# Patient Record
Sex: MALE | Race: WHITE | NOT HISPANIC OR LATINO | Employment: OTHER | URBAN - METROPOLITAN AREA
[De-identification: names, ages, dates, MRNs, and addresses within clinical notes are randomized per-mention and may not be internally consistent; named-entity substitution may affect disease eponyms.]

---

## 2023-02-28 DIAGNOSIS — R29.898 BILATERAL LEG WEAKNESS: Primary | ICD-10-CM

## 2023-08-22 ENCOUNTER — OFFICE VISIT (OUTPATIENT)
Dept: RHEUMATOLOGY | Facility: CLINIC | Age: 58
End: 2023-08-22
Payer: COMMERCIAL

## 2023-08-22 VITALS
DIASTOLIC BLOOD PRESSURE: 84 MMHG | TEMPERATURE: 99 F | WEIGHT: 214.81 LBS | HEIGHT: 78 IN | BODY MASS INDEX: 24.85 KG/M2 | SYSTOLIC BLOOD PRESSURE: 126 MMHG | HEART RATE: 80 BPM | OXYGEN SATURATION: 98 %

## 2023-08-22 DIAGNOSIS — Z29.89 IMMUNOTHERAPY: ICD-10-CM

## 2023-08-22 DIAGNOSIS — R76.8 POSITIVE ANA (ANTINUCLEAR ANTIBODY): ICD-10-CM

## 2023-08-22 DIAGNOSIS — G47.00 INSOMNIA, UNSPECIFIED TYPE: ICD-10-CM

## 2023-08-22 DIAGNOSIS — M79.7 FIBROMYALGIA SYNDROME: ICD-10-CM

## 2023-08-22 DIAGNOSIS — G62.9 NEUROPATHY: ICD-10-CM

## 2023-08-22 DIAGNOSIS — M35.9 UNDIFFERENTIATED CONNECTIVE TISSUE DISEASE: Primary | ICD-10-CM

## 2023-08-22 PROCEDURE — 3074F SYST BP LT 130 MM HG: CPT | Mod: CPTII,S$GLB,, | Performed by: INTERNAL MEDICINE

## 2023-08-22 PROCEDURE — 3079F PR MOST RECENT DIASTOLIC BLOOD PRESSURE 80-89 MM HG: ICD-10-PCS | Mod: CPTII,S$GLB,, | Performed by: INTERNAL MEDICINE

## 2023-08-22 PROCEDURE — 4010F PR ACE/ARB THEARPY RXD/TAKEN: ICD-10-PCS | Mod: CPTII,S$GLB,, | Performed by: INTERNAL MEDICINE

## 2023-08-22 PROCEDURE — 1159F PR MEDICATION LIST DOCUMENTED IN MEDICAL RECORD: ICD-10-PCS | Mod: CPTII,S$GLB,, | Performed by: INTERNAL MEDICINE

## 2023-08-22 PROCEDURE — 3008F PR BODY MASS INDEX (BMI) DOCUMENTED: ICD-10-PCS | Mod: CPTII,S$GLB,, | Performed by: INTERNAL MEDICINE

## 2023-08-22 PROCEDURE — 1159F MED LIST DOCD IN RCRD: CPT | Mod: CPTII,S$GLB,, | Performed by: INTERNAL MEDICINE

## 2023-08-22 PROCEDURE — 3079F DIAST BP 80-89 MM HG: CPT | Mod: CPTII,S$GLB,, | Performed by: INTERNAL MEDICINE

## 2023-08-22 PROCEDURE — 99204 OFFICE O/P NEW MOD 45 MIN: CPT | Mod: S$GLB,,, | Performed by: INTERNAL MEDICINE

## 2023-08-22 PROCEDURE — 99999 PR PBB SHADOW E&M-EST. PATIENT-LVL IV: ICD-10-PCS | Mod: PBBFAC,,, | Performed by: INTERNAL MEDICINE

## 2023-08-22 PROCEDURE — 3074F PR MOST RECENT SYSTOLIC BLOOD PRESSURE < 130 MM HG: ICD-10-PCS | Mod: CPTII,S$GLB,, | Performed by: INTERNAL MEDICINE

## 2023-08-22 PROCEDURE — 99999 PR PBB SHADOW E&M-EST. PATIENT-LVL IV: CPT | Mod: PBBFAC,,, | Performed by: INTERNAL MEDICINE

## 2023-08-22 PROCEDURE — 3008F BODY MASS INDEX DOCD: CPT | Mod: CPTII,S$GLB,, | Performed by: INTERNAL MEDICINE

## 2023-08-22 PROCEDURE — 99204 PR OFFICE/OUTPT VISIT, NEW, LEVL IV, 45-59 MIN: ICD-10-PCS | Mod: S$GLB,,, | Performed by: INTERNAL MEDICINE

## 2023-08-22 PROCEDURE — 4010F ACE/ARB THERAPY RXD/TAKEN: CPT | Mod: CPTII,S$GLB,, | Performed by: INTERNAL MEDICINE

## 2023-08-22 RX ORDER — TIZANIDINE 4 MG/1
4 TABLET ORAL NIGHTLY
Qty: 30 TABLET | Refills: 5 | Status: SHIPPED | OUTPATIENT
Start: 2023-08-22 | End: 2024-02-18

## 2023-08-22 RX ORDER — ALBUTEROL SULFATE 90 UG/1
2 AEROSOL, METERED RESPIRATORY (INHALATION)
COMMUNITY
Start: 2023-04-25

## 2023-08-22 RX ORDER — LEVOTHYROXINE SODIUM 75 UG/1
75 TABLET ORAL DAILY
COMMUNITY
Start: 2023-08-15

## 2023-08-22 RX ORDER — TRAMADOL HYDROCHLORIDE 50 MG/1
50-100 TABLET ORAL EVERY 8 HOURS PRN
COMMUNITY
Start: 2023-06-22 | End: 2023-08-22 | Stop reason: SDUPTHER

## 2023-08-22 RX ORDER — CALCIUM CARBONATE/VITAMIN D3 600 MG-10
100 TABLET ORAL DAILY
COMMUNITY
Start: 2023-08-18

## 2023-08-22 RX ORDER — FOLIC ACID 1 MG/1
1000 TABLET ORAL DAILY
COMMUNITY
Start: 2023-07-20

## 2023-08-22 RX ORDER — FLUTICASONE FUROATE, UMECLIDINIUM BROMIDE AND VILANTEROL TRIFENATATE 200; 62.5; 25 UG/1; UG/1; UG/1
1 POWDER RESPIRATORY (INHALATION) DAILY
COMMUNITY
Start: 2023-07-11

## 2023-08-22 RX ORDER — TRAMADOL HYDROCHLORIDE 50 MG/1
50-100 TABLET ORAL EVERY 8 HOURS PRN
Qty: 100 TABLET | Refills: 5 | Status: SHIPPED | OUTPATIENT
Start: 2023-08-22

## 2023-08-22 RX ORDER — OMEPRAZOLE 40 MG/1
40 CAPSULE, DELAYED RELEASE ORAL DAILY
COMMUNITY

## 2023-08-22 RX ORDER — HYDROXYCHLOROQUINE SULFATE 200 MG/1
200 TABLET, FILM COATED ORAL 2 TIMES DAILY
Qty: 60 TABLET | Refills: 5 | Status: SHIPPED | OUTPATIENT
Start: 2023-08-22 | End: 2024-02-22

## 2023-08-22 RX ORDER — FLUTICASONE PROPIONATE 50 MCG
50 SPRAY, SUSPENSION (ML) NASAL DAILY
COMMUNITY

## 2023-08-22 RX ORDER — GABAPENTIN 600 MG/1
600 TABLET ORAL 3 TIMES DAILY
COMMUNITY
Start: 2023-08-16

## 2023-08-22 NOTE — PROGRESS NOTES
"Subjective:       Patient ID: Shane Lovell is a 58 y.o. male.    Chief Complaint: Referral (Referral for +SHIRLEY. Patient states that he has seen a Rheumatologist for RA. He was put on a medication but has not been on it due to having been diagnosed with Cancer and going through chemo. Patient complains of fatigue,being cold all the time, dry mouth, neuropathy, joint pain, knee pain.  He is having immune therapy no longer taking chemo. He has been diagnosed with carpal tunnel in both wrists recently. Patient states that he is unsteady at times. Pain 5/10.)    Pt  is complaining of joint pain involving his MCP PIP wrist elbow shoulders hips knees and ankles bilaterally.  Is 8/10 in intensity dull in quality and continuous.  It is associated with a morning stiffness lasting for more than 60 minutes. Pt reports having difficulty maintaining a good night of sleep and this has been associated with myalgia of 8/10 in intensity.  This pain is dull continuous and gets worse mainly at night.  It is associated with fatigue.  No fever no chills no others.    Labs checked during this visit   Positive SHIRLEY, history of lung ca on immunotherapy    Review of Systems   Constitutional:  Negative for appetite change, chills and fever.   HENT:  Negative for congestion, ear pain, mouth sores, nosebleeds and trouble swallowing.    Eyes:  Negative for photophobia and discharge.   Respiratory:  Negative for chest tightness and shortness of breath.    Cardiovascular:  Negative for chest pain.   Gastrointestinal:  Negative for abdominal pain and vomiting.   Endocrine: Negative.    Genitourinary:  Negative for hematuria.   Musculoskeletal:         As per HPI   Skin:  Negative for rash.   Neurological:  Negative for weakness.         Objective:   /84 (BP Location: Right arm, Patient Position: Sitting, BP Method: Medium (Automatic))   Pulse 80   Temp 98.6 °F (37 °C) (Oral)   Ht 6' 6" (1.981 m)   Wt 97.4 kg (214 lb 12.8 oz)   SpO2 98%  "  BMI 24.82 kg/m²      Physical Exam   Constitutional: He is oriented to person, place, and time. He appears well-developed and well-nourished. No distress.   HENT:   Head: Normocephalic and atraumatic.   Right Ear: External ear normal.   Left Ear: External ear normal.   Eyes: Pupils are equal, round, and reactive to light.   Cardiovascular: Normal rate, regular rhythm and normal heart sounds.   Pulmonary/Chest: Breath sounds normal.   Abdominal: Soft. There is no abdominal tenderness.   Musculoskeletal:      Right shoulder: Tenderness present.      Left shoulder: Tenderness present.      Right elbow: Tenderness present.      Left elbow: Tenderness present.      Right wrist: Tenderness present.      Left wrist: Tenderness present.      Cervical back: Neck supple.      Right hip: Tenderness present.      Left hip: Tenderness present.      Right knee: Tenderness present.      Left knee: Tenderness present.      Right ankle: Tenderness present.      Left ankle: Tenderness present.   Lymphadenopathy:     He has no cervical adenopathy.   Neurological: He is alert and oriented to person, place, and time. He displays normal reflexes. No cranial nerve deficit or sensory deficit. He exhibits normal muscle tone. Coordination normal.   Skin: No rash noted. No erythema.   Vitals reviewed.      Right Side Rheumatological Exam     The patient is tender to palpation of the shoulder, elbow, wrist, knee, 1st PIP, 1st MCP, 2nd PIP, 2nd MCP, 3rd PIP, 3rd MCP, 4th PIP, 4th MCP, 5th PIP, hip, ankle, 1st MTP, 2nd MTP, 3rd MTP, 4th MTP, 5th MTP, 1st toe IP, 2nd toe IP, 3rd toe IP, 4th toe IP and 5th toe IP    Left Side Rheumatological Exam     The patient is tender to palpation of the shoulder, elbow, wrist, knee, 1st PIP, 1st MCP, 2nd PIP, 2nd MCP, 3rd PIP, 3rd MCP, 4th PIP, 4th MCP, 5th PIP, 5th MCP, hip, ankle, 1st MTP, 2nd MTP, 3rd MTP, 4th MTP, 5th MTP, 1st toe IP, 2nd toe IP, 3rd toe IP, 4th toe IP and 5th toe IP.       Completed  Fibromyalgia exam 18/18 tender points.  No data to display     Assessment:       1. Undifferentiated connective tissue disease    2. Positive SHIRLEY (antinuclear antibody)    3. Neuropathy    4. Insomnia, unspecified type    5. Fibromyalgia syndrome    6. Immunotherapy          Medication List with Changes/Refills   New Medications    HYDROXYCHLOROQUINE (PLAQUENIL) 200 MG TABLET    Take 1 tablet (200 mg total) by mouth 2 (two) times daily. After food       Start Date: 8/22/2023 End Date: 2/22/2024    TIZANIDINE (ZANAFLEX) 4 MG TABLET    Take 1 tablet (4 mg total) by mouth nightly.       Start Date: 8/22/2023 End Date: 2/18/2024   Current Medications    ALBUTEROL (PROVENTIL/VENTOLIN HFA) 90 MCG/ACTUATION INHALER    Inhale 2 puffs into the lungs as needed.       Start Date: 4/25/2023 End Date: --    FLUTICASONE PROPIONATE (FLONASE) 50 MCG/ACTUATION NASAL SPRAY    50 mcg by Nasal route once daily.       Start Date: --        End Date: --    FOLIC ACID (FOLVITE) 1 MG TABLET    Take 1,000 mcg by mouth once daily.       Start Date: 7/20/2023 End Date: --    GABAPENTIN (NEURONTIN) 600 MG TABLET    Take 600 mg by mouth 3 (three) times daily.       Start Date: 8/16/2023 End Date: --    LEVOTHYROXINE (SYNTHROID) 75 MCG TABLET    Take 75 mcg by mouth once daily.       Start Date: 8/15/2023 End Date: --    OMEPRAZOLE (PRILOSEC) 40 MG CAPSULE    Take 40 mg by mouth once daily.       Start Date: --        End Date: --    TRELEGY ELLIPTA 200-62.5-25 MCG INHALER    Inhale 1 puff into the lungs once daily.       Start Date: 7/11/2023 End Date: --    VITAMIN B-1, MONONITRATE, 100 MG TAB    Take 100 mg by mouth once daily.       Start Date: 8/18/2023 End Date: --   Changed and/or Refilled Medications    Modified Medication Previous Medication    TRAMADOL (ULTRAM) 50 MG TABLET traMADoL (ULTRAM) 50 mg tablet       Take 1-2 tablets ( mg total) by mouth every 8 (eight) hours as needed for Pain.    Take  mg by mouth every 8  (eight) hours as needed.       Start Date: 8/22/2023 End Date: --    Start Date: 6/22/2023 End Date: 8/22/2023         Plan:         Problem List Items Addressed This Visit    None  Visit Diagnoses       Undifferentiated connective tissue disease    -  Primary    Relevant Medications    traMADoL (ULTRAM) 50 mg tablet    tiZANidine (ZANAFLEX) 4 MG tablet    hydrOXYchloroQUINE (PLAQUENIL) 200 mg tablet    Other Relevant Orders    CBC Auto Differential    Comprehensive Metabolic Panel    CRP, High Sensitivity    Vitamin D    Rheumatoid Quantitative    Cyclic Citrullinated Peptide Antibody, IgG    Antinuclear Ab, HEp-2 Substrate    Hepatitis B Surface Antigen    Hepatitis C Antibody    TSH    T4, Free    Quantiferon Gold TB    ANTINUCLEAR ANTIBODIES COMPREHENSIVE PANEL    Positive SHIRLEY (antinuclear antibody)        Relevant Medications    traMADoL (ULTRAM) 50 mg tablet    tiZANidine (ZANAFLEX) 4 MG tablet    hydrOXYchloroQUINE (PLAQUENIL) 200 mg tablet    Other Relevant Orders    CBC Auto Differential    Comprehensive Metabolic Panel    CRP, High Sensitivity    Vitamin D    Rheumatoid Quantitative    Cyclic Citrullinated Peptide Antibody, IgG    Antinuclear Ab, HEp-2 Substrate    Hepatitis B Surface Antigen    Hepatitis C Antibody    TSH    T4, Free    Quantiferon Gold TB    ANTINUCLEAR ANTIBODIES COMPREHENSIVE PANEL    Neuropathy        Relevant Medications    traMADoL (ULTRAM) 50 mg tablet    tiZANidine (ZANAFLEX) 4 MG tablet    hydrOXYchloroQUINE (PLAQUENIL) 200 mg tablet    Other Relevant Orders    CBC Auto Differential    Comprehensive Metabolic Panel    CRP, High Sensitivity    Vitamin D    Rheumatoid Quantitative    Cyclic Citrullinated Peptide Antibody, IgG    Antinuclear Ab, HEp-2 Substrate    Hepatitis B Surface Antigen    Hepatitis C Antibody    TSH    T4, Free    Quantiferon Gold TB    ANTINUCLEAR ANTIBODIES COMPREHENSIVE PANEL    Insomnia, unspecified type        Relevant Medications    traMADoL (ULTRAM) 50  mg tablet    tiZANidine (ZANAFLEX) 4 MG tablet    hydrOXYchloroQUINE (PLAQUENIL) 200 mg tablet    Other Relevant Orders    CBC Auto Differential    Comprehensive Metabolic Panel    CRP, High Sensitivity    Vitamin D    Rheumatoid Quantitative    Cyclic Citrullinated Peptide Antibody, IgG    Antinuclear Ab, HEp-2 Substrate    Hepatitis B Surface Antigen    Hepatitis C Antibody    TSH    T4, Free    Quantiferon Gold TB    ANTINUCLEAR ANTIBODIES COMPREHENSIVE PANEL    Fibromyalgia syndrome        Relevant Medications    traMADoL (ULTRAM) 50 mg tablet    tiZANidine (ZANAFLEX) 4 MG tablet    hydrOXYchloroQUINE (PLAQUENIL) 200 mg tablet    Other Relevant Orders    CBC Auto Differential    Comprehensive Metabolic Panel    CRP, High Sensitivity    Vitamin D    Rheumatoid Quantitative    Cyclic Citrullinated Peptide Antibody, IgG    Antinuclear Ab, HEp-2 Substrate    Hepatitis B Surface Antigen    Hepatitis C Antibody    TSH    T4, Free    Quantiferon Gold TB    ANTINUCLEAR ANTIBODIES COMPREHENSIVE PANEL    Immunotherapy        Relevant Medications    traMADoL (ULTRAM) 50 mg tablet    tiZANidine (ZANAFLEX) 4 MG tablet    hydrOXYchloroQUINE (PLAQUENIL) 200 mg tablet    Other Relevant Orders    CBC Auto Differential    Comprehensive Metabolic Panel    CRP, High Sensitivity    Vitamin D    Rheumatoid Quantitative    Cyclic Citrullinated Peptide Antibody, IgG    Antinuclear Ab, HEp-2 Substrate    Hepatitis B Surface Antigen    Hepatitis C Antibody    TSH    T4, Free    Quantiferon Gold TB    ANTINUCLEAR ANTIBODIES COMPREHENSIVE PANEL